# Patient Record
Sex: MALE
[De-identification: names, ages, dates, MRNs, and addresses within clinical notes are randomized per-mention and may not be internally consistent; named-entity substitution may affect disease eponyms.]

---

## 2021-05-16 ENCOUNTER — HOSPITAL ENCOUNTER (EMERGENCY)
Dept: HOSPITAL 47 - EC | Age: 61
Discharge: HOME | End: 2021-05-16
Payer: COMMERCIAL

## 2021-05-16 VITALS — DIASTOLIC BLOOD PRESSURE: 84 MMHG | HEART RATE: 78 BPM | SYSTOLIC BLOOD PRESSURE: 118 MMHG

## 2021-05-16 VITALS — RESPIRATION RATE: 18 BRPM

## 2021-05-16 DIAGNOSIS — K06.8: Primary | ICD-10-CM

## 2021-05-16 PROCEDURE — 99283 EMERGENCY DEPT VISIT LOW MDM: CPT

## 2021-05-16 NOTE — ED
ENT HPI





- General


Chief complaint: Dental/Oral


Stated complaint: Oral bleeding


Time Seen by Provider: 05/16/21 08:57


Source: patient, family, RN notes reviewed


Mode of arrival: wheelchair


Limitations: no limitations





- History of Present Illness


Initial comments: 





61-year-old male presents emergency Department chief complaint of bleeding from 

his gums.  Patient states he had all his teeth removed.  Patient states that he 

sees and started bleeding it did stop and ruptured again.  Patient has bleeding 

from lower gumline.  Denies any blood thinners.  Patient offers no other 

complaints.  He states that his teeth were removed by [facility].





- Related Data


                                    Allergies











Allergy/AdvReac Type Severity Reaction Status Date / Time


 


No Known Allergies Allergy   Verified 05/16/21 08:53














Review of Systems


ROS Statement: 


Those systems with pertinent positive or pertinent negative responses have been 

documented in the HPI.





ROS Other: All systems not noted in ROS Statement are negative.





Past Medical History


Past Medical History: No Reported History


History of Any Multi-Drug Resistant Organisms: None Reported


Past Surgical History: No Surgical Hx Reported


Past Psychological History: No Psychological Hx Reported


Smoking Status: Never smoker


Past Alcohol Use History: None Reported


Past Drug Use History: None Reported





General Exam


Limitations: no limitations


General appearance: alert, in no apparent distress


Head exam: Present: atraumatic, normocephalic, normal inspection


Eye exam: Present: normal appearance, PERRL, EOMI.  Absent: scleral icterus, 

conjunctival injection, periorbital swelling


ENT exam: Present: mucous membranes moist.  Absent: normal exam, normal 

oropharynx (No dentition there is active bleeding from lower anterior gum line)


Neck exam: Present: normal inspection, full ROM.  Absent: tenderness, 

meningismus, lymphadenopathy


Respiratory exam: Present: normal lung sounds bilaterally.  Absent: respiratory 

distress, wheezes, rales, rhonchi, stridor


Cardiovascular Exam: Present: regular rate, normal rhythm, normal heart sounds. 

Absent: systolic murmur, diastolic murmur, rubs, gallop, clicks





Course





                                   Vital Signs











  05/16/21





  08:48


 


Pulse Rate 73


 


Respiratory 18





Rate 


 


Blood Pressure 119/68


 


O2 Sat by Pulse 100





Oximetry 














Procedures





- Procedures


Initial comment: 





Let was initially applied to the lower area with pressure no relief of bleeding,

1% lidocaine with epinephrine to him also used to inject around the site of 

bleeding and lower gum bleeding has subsided.   tolerated well no 

complications





Medical Decision Making





- Medical Decision Making





Patient had some noted bleeding from his lower gumline from recent until 

extraction.  Patient was observed after bleeding stopped for 20-25 minutes with 

no rebleeding patient feels comfortable with discharge.





Disposition


Clinical Impression: 


 Gum hemorrhage





Disposition: HOME SELF-CARE


Condition: Stable


Additional Instructions: 


Please return to the Emergency Department if symptoms worsen or any other 

concerns.


Is patient prescribed a controlled substance at d/c from ED?: No


Referrals: 


None,Stated [Primary Care Provider] - 1-2 days


Time of Disposition: 10:04

## 2022-06-03 ENCOUNTER — HOSPITAL ENCOUNTER (OUTPATIENT)
Dept: HOSPITAL 47 - OR | Age: 62
Discharge: HOME | End: 2022-06-03
Attending: SURGERY
Payer: COMMERCIAL

## 2022-06-03 VITALS — RESPIRATION RATE: 16 BRPM

## 2022-06-03 VITALS — DIASTOLIC BLOOD PRESSURE: 72 MMHG | SYSTOLIC BLOOD PRESSURE: 118 MMHG | HEART RATE: 48 BPM

## 2022-06-03 VITALS — TEMPERATURE: 97.3 F

## 2022-06-03 DIAGNOSIS — M79.5: ICD-10-CM

## 2022-06-03 DIAGNOSIS — Z98.890: ICD-10-CM

## 2022-06-03 DIAGNOSIS — Z97.2: ICD-10-CM

## 2022-06-03 DIAGNOSIS — K40.90: Primary | ICD-10-CM

## 2022-06-03 LAB
ERYTHROCYTE [DISTWIDTH] IN BLOOD BY AUTOMATED COUNT: 4.39 M/UL (ref 4.3–5.9)
ERYTHROCYTE [DISTWIDTH] IN BLOOD: 12.8 % (ref 11.5–15.5)
HCT VFR BLD AUTO: 41.4 % (ref 39–53)
HGB BLD-MCNC: 14.3 GM/DL (ref 13–17.5)
MCH RBC QN AUTO: 32.6 PG (ref 25–35)
MCHC RBC AUTO-ENTMCNC: 34.5 G/DL (ref 31–37)
MCV RBC AUTO: 94.4 FL (ref 80–100)
PLATELET # BLD AUTO: 91 K/UL (ref 150–450)
WBC # BLD AUTO: 4.4 K/UL (ref 3.8–10.6)

## 2022-06-03 PROCEDURE — 86850 RBC ANTIBODY SCREEN: CPT

## 2022-06-03 PROCEDURE — 85027 COMPLETE CBC AUTOMATED: CPT

## 2022-06-03 PROCEDURE — 86901 BLOOD TYPING SEROLOGIC RH(D): CPT

## 2022-06-03 PROCEDURE — 88302 TISSUE EXAM BY PATHOLOGIST: CPT

## 2022-06-03 PROCEDURE — 86900 BLOOD TYPING SEROLOGIC ABO: CPT

## 2022-06-03 PROCEDURE — 49650 LAP ING HERNIA REPAIR INIT: CPT

## 2022-06-03 RX ADMIN — HYDROMORPHONE HYDROCHLORIDE PRN MG: 1 INJECTION, SOLUTION INTRAMUSCULAR; INTRAVENOUS; SUBCUTANEOUS at 10:23

## 2022-06-03 RX ADMIN — HYDROMORPHONE HYDROCHLORIDE PRN MG: 1 INJECTION, SOLUTION INTRAMUSCULAR; INTRAVENOUS; SUBCUTANEOUS at 10:30

## 2022-06-03 RX ADMIN — HYDROMORPHONE HYDROCHLORIDE PRN MG: 1 INJECTION, SOLUTION INTRAMUSCULAR; INTRAVENOUS; SUBCUTANEOUS at 10:40

## 2022-06-03 NOTE — P.OP
Date of Procedure: 06/03/22


Procedure(s) Performed: 


PREOPERATIVE DIAGNOSIS: Right inguinal hernia


POSTOPERATIVE DIAGNOSIS: Right indirect and direct inguinal hernia


PROCEDURE: Laparoscopic da Seth assisted repair right inguinal hernia with mesh


SURGEON: Dr. Alvarez


ANESTHESIA: General


OPERATIVE PROCEDURE DETAILS:  Patient was placed in the operating table in the 

supine position.  The patient was placed under general anesthesia.  The abdomen 

was prepped and draped in usual sterile fashion.  A small curvilinear 

supraumbilical incision was made.  The fascia was retracted anteriorly with 

Rahul forceps.  The Veress needle was inserted.  The saline drop test was 

normal.  Insufflation took place to 15 mmHg. An 8 mm trocar was placed into the 

peritoneal cavity.  2 additional 8 mm trochars were placed in the right upper 

quadrant and left upper quadrant under visualization.  The robotic arms were 

then brought in and docked into place.  The fenestrated bipolar was used in the 

left arm and the laparoscopic radha was utilized in the right arm.  A 30 8 mm

scope was used in the up position.  The peritoneal cavity was inspected.  The 

patient had an obvious direct hernia on the right-hand side.  Initially it did 

not appear that there was an indirect hernia on the right side.  There was no 

visible hernia on the left.  The peritoneum was incised in a horizontal fashion 

cephalad to the internal inguinal ring.  Following that careful dissection of 

the preperitoneal space took place.  This took place using both electrocautery, 

sharp dissection but primarily blunt dissection.  Visualization of the pubic 

tubercle and Haresh's ligament took place medially.  Full dissection took place 

laterally as well.  The direct hernia sac was fully dissected.  The patient was 

noted to have a small very narrow patent processes vaginalis that actually as we

followed it down into the scrotum widened out somewhat and was noted to be a 

sizable hernia.  The opening at the internal inguinal ring however was quite 

small measuring only about 4 mm in diameter.  I was eventually able to invert 

the hernia sac.  Once we had adequate space the extra-large Bard 3-D mid mesh 

was advanced into the preperitoneal space and flattened out appropriately to 

cover all potential hernia sites.  A 20 absorbable be locked suture was used to 

secure the mesh medially.  This was a running stitch in a vertical fashion 

superior to the pubic tubercle.  The peritoneal defect was then closed using a 

absorbable 2-0 VLok suture.  The hernia sac was incorporated into the peritoneal

closure to help prevent future recurrence.  The redundant excess hernia sac was 

actually ligated because of its narrow section at the internal inguinal ring.  

The pneumoperitoneum was then evacuated.  The skin of all 3 sites was closed 

using a 4-0 Monocryl stitch.  Skin glue was then applied.


TYPE OF MESH USED: Bard 3-D mid 5 x 7"


LOCATION OF MESH: Preperitoneal


FIXATION: Absorbable 20V lock suture


PREOPERATIVE DISCUSSION ON SMOKING CESSASTION: Yes


PREOPERATIVE DISCUSSION ON MORBID OBESITY: Yes


PREOPERATIVE DISCUSSION ON APPROPRIATE USE OF NARCOTIC USE: Yes


PREOPERATIVE EDUCATION: Multi Modal, Smoking Cessation and Weight Loss with BMI 

over 35. 


DISPOSITION:  Stable to recovery room

## 2022-06-03 NOTE — P.GSHP
History of Present Illness


H&P Date: 06/03/22


Chief Complaint: Right inguinal hernia





62-year-old male here today for elective repair right inguinal hernia.  Patient 

has pain and swelling in that area.  Increasing in size.  In the office on exam 

I thought we may feel a small left-sided hernia as well.  The patient states and

preop that he does not want that addressed unless it is necessary.  He is having

no pain or problems there.





Past Medical History


Past Medical History: No Reported History


Additional Past Medical History / Comment(s): BILAT INGUINAL HERNIA.  HAS BULLET

IN LT LEG FROM YEARS AGO


History of Any Multi-Drug Resistant Organisms: None Reported


Past Surgical History: No Surgical Hx Reported


Additional Past Surgical History / Comment(s): POPCORN  KERNAL REMOVED UNDER 

ANESTHESIA AS TEEN


Past Anesthesia/Blood Transfusion Reactions: Postoperative Nausea & Vomiting 

(PONV)


Past Psychological History: No Psychological Hx Reported


Smoking Status: Never smoker


Past Alcohol Use History: None Reported


Past Drug Use History: None Reported





- Past Family History


  ** Mother


Family Medical History: No Reported History





Medications and Allergies


                                Home Medications











 Medication  Instructions  Recorded  Confirmed  Type


 


No Known Home Medications  05/31/22 06/03/22 History








                                    Allergies











Allergy/AdvReac Type Severity Reaction Status Date / Time


 


No Known Allergies Allergy   Verified 06/03/22 06:23














Surgical - Exam


                                   Vital Signs











Temp Pulse Resp BP Pulse Ox


 


 97.3 F L  44 L  20   116/63   95 


 


 06/03/22 06:29  06/03/22 06:29  06/03/22 06:29  06/03/22 06:29  06/03/22 06:29

















Physical exam:


General: Well-developed, well-nourished


HEENT: Normocephalic, sclerae nonicteric


Abdomen: Nontender, nondistended, moderate size reducible right inguinal hernia,

possible small reducible left inguinal hernia


Extremities: No edema


Neuro: Alert and oriented





Results





- Labs





                                 06/03/22 06:50





                  Abnormal Lab Results - Last 24 Hours (Table)











  06/03/22 Range/Units





  06:50 


 


Plt Count  91 L  (150-450)  k/uL














Assessment and Plan


(1) Inguinal hernia


Narrative/Plan: 


62-year-old male with symptomatic right inguinal hernia.  Patient found have a 

possible small left inguinal hernia on exam but the patient would like to avoid 

surgery in that area unless necessary.  We'll proceed with laparoscopic da Seth

assisted repair right inguinal hernia with mesh, possible open, possible 

bilateral.  Patient I agreed after our conversation that we would only fix the 

patient's left inguinal hernia unless it appeared fairly large.  Risks of 

bleeding, infection, recurrence, bladder and bowel injury, numbness, nerve 

injury, conversion to an open procedure were discussed with the patient.  The 

patient understands and wishes to proceed.


Current Visit: Yes   Status: Acute   Code(s): K40.90 - UNIL INGUINAL HERNIA, W/O

OBST OR GANGR, NOT SPCF AS RECUR   SNOMED Code(s): 418659179